# Patient Record
Sex: FEMALE | Race: WHITE | NOT HISPANIC OR LATINO
[De-identification: names, ages, dates, MRNs, and addresses within clinical notes are randomized per-mention and may not be internally consistent; named-entity substitution may affect disease eponyms.]

---

## 2019-10-03 ENCOUNTER — FORM ENCOUNTER (OUTPATIENT)
Age: 74
End: 2019-10-03

## 2021-02-24 ENCOUNTER — FORM ENCOUNTER (OUTPATIENT)
Age: 76
End: 2021-02-24

## 2021-03-02 ENCOUNTER — FORM ENCOUNTER (OUTPATIENT)
Age: 76
End: 2021-03-02

## 2021-03-03 ENCOUNTER — FORM ENCOUNTER (OUTPATIENT)
Age: 76
End: 2021-03-03

## 2021-03-09 ENCOUNTER — FORM ENCOUNTER (OUTPATIENT)
Age: 76
End: 2021-03-09

## 2021-03-16 ENCOUNTER — FORM ENCOUNTER (OUTPATIENT)
Age: 76
End: 2021-03-16

## 2022-08-02 PROBLEM — Z00.00 ENCOUNTER FOR PREVENTIVE HEALTH EXAMINATION: Status: ACTIVE | Noted: 2022-08-02

## 2022-08-03 ENCOUNTER — APPOINTMENT (OUTPATIENT)
Dept: ORTHOPEDIC SURGERY | Facility: CLINIC | Age: 77
End: 2022-08-03

## 2022-08-03 DIAGNOSIS — M25.562 PAIN IN LEFT KNEE: ICD-10-CM

## 2022-08-03 DIAGNOSIS — G89.29 PAIN IN LEFT KNEE: ICD-10-CM

## 2022-08-03 PROCEDURE — 99214 OFFICE O/P EST MOD 30 MIN: CPT

## 2022-08-03 PROCEDURE — 73562 X-RAY EXAM OF KNEE 3: CPT | Mod: LT

## 2022-08-03 NOTE — DISCUSSION/SUMMARY
[de-identified] : I reviewed patient's radiographs and discussed her condition and treatment options.  Continue bicycle riding for activity.  Follow up with Dr. Morton to discuss candidacy for total or partial knee arthroplasty.  Defer left ankle hardware removal.  Patient voiced understanding and agreement with the plan.\par

## 2022-08-03 NOTE — HISTORY OF PRESENT ILLNESS
[Gradual] : gradual [Dull/Aching] : dull/aching [Sharp] : sharp [Throbbing] : throbbing [Intermittent] : intermittent [de-identified] : Patient presents for evaluation of left knee pain.  She reports chronic pain and last had injections by Dr. Sullivan about 5 years ago.  She is able to walk without assistance and denies any pain with bike rides.  She reports the pain is worse with going up and down stairs.  She also would like to discuss noticing a bump along her left ankle. [] : no [de-identified] : 7/17/2019 [de-identified] : 2019

## 2022-10-31 ENCOUNTER — APPOINTMENT (OUTPATIENT)
Dept: VASCULAR SURGERY | Facility: CLINIC | Age: 77
End: 2022-10-31

## 2022-10-31 VITALS
WEIGHT: 142 LBS | HEIGHT: 67 IN | BODY MASS INDEX: 22.29 KG/M2 | TEMPERATURE: 97.4 F | SYSTOLIC BLOOD PRESSURE: 148 MMHG | DIASTOLIC BLOOD PRESSURE: 84 MMHG

## 2022-10-31 DIAGNOSIS — M12.9 ARTHROPATHY, UNSPECIFIED: ICD-10-CM

## 2022-10-31 DIAGNOSIS — Z78.9 OTHER SPECIFIED HEALTH STATUS: ICD-10-CM

## 2022-10-31 DIAGNOSIS — Z86.69 PERSONAL HISTORY OF OTHER DISEASES OF THE NERVOUS SYSTEM AND SENSE ORGANS: ICD-10-CM

## 2022-10-31 DIAGNOSIS — Z86.79 PERSONAL HISTORY OF OTHER DISEASES OF THE CIRCULATORY SYSTEM: ICD-10-CM

## 2022-10-31 PROCEDURE — 93923 UPR/LXTR ART STDY 3+ LVLS: CPT

## 2022-10-31 RX ORDER — NAPROXEN SODIUM 220 MG
TABLET ORAL
Refills: 0 | Status: ACTIVE | COMMUNITY

## 2022-10-31 RX ORDER — LATANOPROST/PF 0.005 %
0.01 DROPS OPHTHALMIC (EYE)
Refills: 0 | Status: ACTIVE | COMMUNITY

## 2022-10-31 RX ORDER — CYCLOSPORINE 0.5 MG/ML
0.05 EMULSION OPHTHALMIC
Refills: 0 | Status: ACTIVE | COMMUNITY

## 2022-10-31 RX ORDER — ZINC SULFATE 50(220)MG
CAPSULE ORAL
Refills: 0 | Status: ACTIVE | COMMUNITY

## 2022-10-31 RX ORDER — TURMERIC ROOT EXTRACT 500 MG
TABLET ORAL
Refills: 0 | Status: ACTIVE | COMMUNITY

## 2022-10-31 RX ORDER — MULTIVITAMIN
TABLET ORAL
Refills: 0 | Status: ACTIVE | COMMUNITY

## 2022-10-31 RX ORDER — VIT A/VIT C/VIT E/ZINC/COPPER 2148-113
TABLET ORAL
Refills: 0 | Status: ACTIVE | COMMUNITY

## 2022-11-15 ENCOUNTER — APPOINTMENT (OUTPATIENT)
Dept: VASCULAR SURGERY | Facility: CLINIC | Age: 77
End: 2022-11-15

## 2022-11-15 PROCEDURE — 93880 EXTRACRANIAL BILAT STUDY: CPT

## 2022-11-23 ENCOUNTER — APPOINTMENT (OUTPATIENT)
Dept: VASCULAR SURGERY | Facility: CLINIC | Age: 77
End: 2022-11-23

## 2022-11-23 VITALS
TEMPERATURE: 97.2 F | SYSTOLIC BLOOD PRESSURE: 109 MMHG | BODY MASS INDEX: 23.07 KG/M2 | HEIGHT: 67 IN | DIASTOLIC BLOOD PRESSURE: 70 MMHG | WEIGHT: 147 LBS

## 2022-11-23 DIAGNOSIS — I65.23 OCCLUSION AND STENOSIS OF BILATERAL CAROTID ARTERIES: ICD-10-CM

## 2022-11-23 DIAGNOSIS — I73.9 PERIPHERAL VASCULAR DISEASE, UNSPECIFIED: ICD-10-CM

## 2022-11-23 PROCEDURE — 99214 OFFICE O/P EST MOD 30 MIN: CPT

## 2022-11-23 NOTE — HISTORY OF PRESENT ILLNESS
[FreeTextEntry1] : Patient is follow-up after recent vascular testing.  PVR showed stable mild peripheral arterial disease.  Carotid ultrasound shows bilateral plaque formation without significant stenosis.  Patient complains about peripheral neuropathy.  She is exercising daily.  She can walk for more than 35 minutes without claudication symptoms.

## 2022-11-23 NOTE — PHYSICAL EXAM
[JVD] : no jugular venous distention  [Carotid Bruits] : no carotid bruits [Normal Breath Sounds] : Normal breath sounds [Normal Heart Sounds] : normal heart sounds [2+] : left 2+ [1+] : left 1+ [Ankle Swelling (On Exam)] : not present [Varicose Veins Of Lower Extremities] : not present [] : not present [Abdomen Masses] : No abdominal masses [Abdomen Tenderness] : ~T ~M No abdominal tenderness [Abdominal Bruit] : no abdominal bruit  [No Rash or Lesion] : No rash or lesion [de-identified] : No acute distress [de-identified] : Supple

## 2022-11-23 NOTE — ASSESSMENT
[FreeTextEntry1] : Follow-up appointment in 1 year with PVR and carotid ultrasound.  I spent 45 minutes reviewing patient's record and with the patient.